# Patient Record
Sex: MALE | Race: WHITE | NOT HISPANIC OR LATINO | URBAN - METROPOLITAN AREA
[De-identification: names, ages, dates, MRNs, and addresses within clinical notes are randomized per-mention and may not be internally consistent; named-entity substitution may affect disease eponyms.]

---

## 2019-03-01 ENCOUNTER — IMPORTED ENCOUNTER (OUTPATIENT)
Dept: URBAN - METROPOLITAN AREA CLINIC 50 | Facility: CLINIC | Age: 64
End: 2019-03-01

## 2019-03-11 ENCOUNTER — IMPORTED ENCOUNTER (OUTPATIENT)
Dept: URBAN - METROPOLITAN AREA CLINIC 50 | Facility: CLINIC | Age: 64
End: 2019-03-11

## 2019-03-12 ENCOUNTER — IMPORTED ENCOUNTER (OUTPATIENT)
Dept: URBAN - METROPOLITAN AREA CLINIC 50 | Facility: CLINIC | Age: 64
End: 2019-03-12

## 2019-04-03 ENCOUNTER — IMPORTED ENCOUNTER (OUTPATIENT)
Dept: URBAN - METROPOLITAN AREA CLINIC 50 | Facility: CLINIC | Age: 64
End: 2019-04-03

## 2019-04-08 ENCOUNTER — IMPORTED ENCOUNTER (OUTPATIENT)
Dept: URBAN - METROPOLITAN AREA CLINIC 50 | Facility: CLINIC | Age: 64
End: 2019-04-08

## 2019-04-18 ENCOUNTER — IMPORTED ENCOUNTER (OUTPATIENT)
Dept: URBAN - METROPOLITAN AREA CLINIC 50 | Facility: CLINIC | Age: 64
End: 2019-04-18

## 2019-04-22 ENCOUNTER — IMPORTED ENCOUNTER (OUTPATIENT)
Dept: URBAN - METROPOLITAN AREA CLINIC 50 | Facility: CLINIC | Age: 64
End: 2019-04-22

## 2019-04-22 NOTE — PATIENT DISCUSSION
"""S/P IOL OD: Tecnis [id number] 20.0 (ORA) @ 5Âº (Target: [place]) +ORA/Femto +Omidria. Continue post operative instructions and drops per schedule.  """

## 2019-05-02 ENCOUNTER — IMPORTED ENCOUNTER (OUTPATIENT)
Dept: URBAN - METROPOLITAN AREA CLINIC 50 | Facility: CLINIC | Age: 64
End: 2019-05-02

## 2019-05-02 NOTE — PATIENT DISCUSSION
"""S/P IOL OS: Tecnis [id number] 20.0 (Target: [place]) +Femto/Arcs +Omidria. Continue post operative instructions and drops per schedule.  """

## 2019-05-06 ENCOUNTER — IMPORTED ENCOUNTER (OUTPATIENT)
Dept: URBAN - METROPOLITAN AREA CLINIC 50 | Facility: CLINIC | Age: 64
End: 2019-05-06

## 2019-05-06 NOTE — PATIENT DISCUSSION
"""S/P IOL OS: Tecnis ZCB00 20.0 (Target: [place]) +Femto/Arcs +Omidria. Continue post operative instructions and drops per schedule.  """

## 2019-06-03 ENCOUNTER — IMPORTED ENCOUNTER (OUTPATIENT)
Dept: URBAN - METROPOLITAN AREA CLINIC 50 | Facility: CLINIC | Age: 64
End: 2019-06-03

## 2019-06-03 NOTE — PATIENT DISCUSSION
"""S/P IOL OU: OD: Tecnis [id number] 20.0 (ORA) @ 5Âº (Target: [place]) +ORAFemtoOmidria. OS: Tecnis ZCB00 20.0 (Target: [place])/Arcs. "

## 2019-08-06 ENCOUNTER — IMPORTED ENCOUNTER (OUTPATIENT)
Dept: URBAN - METROPOLITAN AREA CLINIC 50 | Facility: CLINIC | Age: 64
End: 2019-08-06

## 2019-08-12 ENCOUNTER — IMPORTED ENCOUNTER (OUTPATIENT)
Dept: URBAN - METROPOLITAN AREA CLINIC 50 | Facility: CLINIC | Age: 64
End: 2019-08-12

## 2019-12-16 ENCOUNTER — IMPORTED ENCOUNTER (OUTPATIENT)
Dept: URBAN - METROPOLITAN AREA CLINIC 50 | Facility: CLINIC | Age: 64
End: 2019-12-16

## 2021-04-17 ASSESSMENT — VISUAL ACUITY
OS_OTHER: 20/60. 20/80.
OD_BAT: >20/400
OD_OTHER: 20/30. 20/40.
OD_CC: J1+
OD_SC: 20/20-1
OS_OTHER: 20/30+. 20/40-.
OS_CC: 20/20-1
OS_PH: 20/30
OD_PH: 20/25-2
OD_CC: J1+
OD_BAT: >20/400
OD_OTHER: >20/400.
OS_OTHER: >20/400.
OD_OTHER: 20/25-. 20/30-.
OD_BAT: >20/400
OS_BAT: >20/400
OD_CC: 20/40-
OD_SC: 20/20
OD_BAT: 20/25-
OD_CC: J1+@ 16 IN
OS_SC: 20/25
OS_OTHER: 20/25. 20/40.
OS_PH: 20/25
OD_OTHER: >20/400.
OS_CC: J1+
OS_PH: 20/25-
OD_BAT: 20/30
OS_OTHER: >20/400. >20/400.
OS_CC: J1+
OD_SC: 20/30-2
OS_BAT: 20/60
OS_CC: J1+
OS_BAT: 20/25
OD_OTHER: 20/30. 20/50.
OS_SC: 20/30
OD_SC: 20/20
OS_BAT: >20/400
OD_SC: 20/20-2
OS_BAT: 20/30+
OS_SC: 20/30
OS_SC: 20/60
OS_CC: 20/200
OS_CC: J1+@ 16 IN
OD_CC: J1+
OS_OTHER: >20/400. >20/400.
OS_SC: 20/30
OD_CC: 20/40
OD_OTHER: >20/400.
OD_SC: 20/20-
OD_BAT: 20/30
OS_CC: 20/30-2
OS_BAT: >20/400

## 2021-04-17 ASSESSMENT — TONOMETRY
OD_IOP_MMHG: 10
OS_IOP_MMHG: 12
OD_IOP_MMHG: 14
OD_IOP_MMHG: 12
OS_IOP_MMHG: 13
OS_IOP_MMHG: 20
OD_IOP_MMHG: 26
OS_IOP_MMHG: 12
OS_IOP_MMHG: 14
OS_IOP_MMHG: 12
OD_IOP_MMHG: 13
OS_IOP_MMHG: 10
OS_IOP_MMHG: 10
OD_IOP_MMHG: 10

## 2021-05-15 ENCOUNTER — PREPPED CHART (OUTPATIENT)
Dept: URBAN - METROPOLITAN AREA CLINIC 50 | Facility: CLINIC | Age: 66
End: 2021-05-15

## 2021-05-15 ASSESSMENT — VISUAL ACUITY
OS_SC: 20/30
OU_CC: J1+
OD_GLARE: 20/30
OS_GLARE: 20/40
OS_GLARE: 20/30
OD_GLARE: 20/25
OD_SC: 20/20

## 2021-05-15 ASSESSMENT — TONOMETRY
OS_IOP_MMHG: 10
OD_IOP_MMHG: 10

## 2021-05-17 ENCOUNTER — COMPREHENSIVE EXAM (OUTPATIENT)
Dept: URBAN - METROPOLITAN AREA CLINIC 50 | Facility: CLINIC | Age: 66
End: 2021-05-17

## 2021-05-17 DIAGNOSIS — H16.223: ICD-10-CM

## 2021-05-17 DIAGNOSIS — H35.372: ICD-10-CM

## 2021-05-17 DIAGNOSIS — H26.493: ICD-10-CM

## 2021-05-17 PROCEDURE — 92014 COMPRE OPH EXAM EST PT 1/>: CPT

## 2021-05-17 PROCEDURE — 92134 CPTRZ OPH DX IMG PST SGM RTA: CPT

## 2021-05-17 ASSESSMENT — KERATOMETRY
OD_AXISANGLE2_DEGREES: 180
OS_AXISANGLE_DEGREES: 072
OD_K1POWER_DIOPTERS: 40.25
OD_AXISANGLE_DEGREES: 090
OD_K2POWER_DIOPTERS: 40.25
OS_K1POWER_DIOPTERS: 40.50
OS_AXISANGLE2_DEGREES: 162
OS_K2POWER_DIOPTERS: 40.00

## 2021-05-17 ASSESSMENT — VISUAL ACUITY
OS_GLARE: 20/25
OD_SC: 20/20
OD_GLARE: 20/25
OS_SC: 20/30
OS_PH: 20/25
OD_GLARE: 20/50
OU_CC: J1+
OS_GLARE: 20/40

## 2021-05-17 ASSESSMENT — TONOMETRY
OD_IOP_MMHG: 13
OS_IOP_MMHG: 15